# Patient Record
Sex: FEMALE | Race: OTHER | HISPANIC OR LATINO | URBAN - METROPOLITAN AREA
[De-identification: names, ages, dates, MRNs, and addresses within clinical notes are randomized per-mention and may not be internally consistent; named-entity substitution may affect disease eponyms.]

---

## 2023-06-27 VITALS
RESPIRATION RATE: 16 BRPM | DIASTOLIC BLOOD PRESSURE: 65 MMHG | WEIGHT: 139.99 LBS | OXYGEN SATURATION: 100 % | SYSTOLIC BLOOD PRESSURE: 145 MMHG | HEART RATE: 57 BPM | TEMPERATURE: 97 F

## 2023-06-27 RX ORDER — CHLORHEXIDINE GLUCONATE 213 G/1000ML
1 SOLUTION TOPICAL ONCE
Refills: 0 | Status: DISCONTINUED | OUTPATIENT
Start: 2023-07-05 | End: 2023-07-19

## 2023-06-27 NOTE — H&P ADULT - HISTORY OF PRESENT ILLNESS
Cardiologist: Dr. Marquez   Pharmacy:   Escort:     72F current smoker PMHx HTN, HLD, Afib (on Xarelto last dose _____) , VT, breast cancer s/p mastectomy s/p chemo/RT referred to Dr. Batres by outpatient cardiologist, Dr. Marquez, after positive stress test. Patient endorses chest tightness. . She denies CP, SOB, palpitations, orthopnea, syncope, presyncope, LE edema, decrease ET tolerance. Baseline sleeps w/ 1 pillow. NST12/12/2022 partially reversible changes in inferior wall suggesting ischemia. TTE per MD note EF 55% w/ septal LVH, basal inferoseptal hypokinesis, and G I DD. Holter per MD note episode of AFib w/ RVR that self terminate and episode of NSVT.     In light of pts risk factors, CCS class __ anginal symptoms and abnormal NST, pt now presents to [facility] for recommended cardiac catheterization with possible intervention if clinically indicated.  Cardiologist: Dr. Marquez   Pharmacy:   Escort:     72F current smoker PMHx HTN, HLD, Afib (on Xarelto last dose _____) , VT, breast cancer s/p mastectomy s/p chemo/RT referred to Dr. Batres by outpatient cardiologist, Dr. Marquez, after positive stress test. Patient endorses chest tightness. . She denies CP, SOB, palpitations, syncope, presyncope, LE edema, decrease ET tolerance. Baseline sleeps w/ 1 pillow. NST12/12/2022 partially reversible changes in inferior wall suggesting ischemia. TTE per MD note EF 55% w/ septal LVH, basal inferoseptal hypokinesis, and G I DD. Holter per MD note episode of AFib w/ RVR that self terminate and episode of NSVT.     In light of pts risk factors, CCS class __ anginal symptoms and abnormal NST, pt now presents to [facility] for recommended cardiac catheterization with possible intervention if clinically indicated.  Cardiologist: Dr. Marquez   Pharmacy:   Escort:     On Xarelto, called and informed patient last dose is 6/30    72F current smoker PMHx HTN, HLD, Afib (on Xarelto last dose _____) , VT, breast cancer s/p mastectomy s/p chemo/RT referred to Dr. Batres by outpatient cardiologist, Dr. Marquez, after positive stress test. Patient endorses chest tightness. . She denies CP, SOB, palpitations, syncope, presyncope, LE edema, decrease ET tolerance. Baseline sleeps w/ 1 pillow. NST12/12/2022 partially reversible changes in inferior wall suggesting ischemia. TTE per MD note EF 55% w/ septal LVH, basal inferoseptal hypokinesis, and G I DD. Holter per MD note episode of AFib w/ RVR that self terminate and episode of NSVT.     In light of pts risk factors, CCS class __ anginal symptoms and abnormal NST, pt now presents to [facility] for recommended cardiac catheterization with possible intervention if clinically indicated.  Cardiologist: Dr. Marquez   Pharmacy: [facility]  Escort: Daughter    72F current smoker PMHx HTN, HLD, Afib (on Xarelto last dose 7/2/23) , VT, breast cancer s/p mastectomy s/p chemo/RT +20 yrs ago referred to Dr. Batres by outpatient cardiologist, Dr. Marquez, after positive stress test. Patient endorses chest tightness in the past although chest pain free currently She denies, SOB, palpitations, syncope, presyncope, LE edema, decrease ET tolerance. Baseline sleeps w/ 1 pillow. NST12/12/2022 partially reversible changes in inferior wall suggesting ischemia. TTE per MD note EF 55% w/ septal LVH, basal inferoseptal hypokinesis, and G I DD. Holter per MD note episode of AFib w/ RVR that self terminate and episode of NSVT.     In light of pts risk factors, CCS class III anginal symptoms and abnormal NST, pt now presents to [facility] for recommended cardiac catheterization with possible intervention if clinically indicated.

## 2023-06-27 NOTE — H&P ADULT - NSICDXPASTMEDICALHX_GEN_ALL_CORE_FT
PAST MEDICAL HISTORY:  Atrial fibrillation     Breast cancer     Hyperlipidemia     Hypertension

## 2023-06-27 NOTE — H&P ADULT - NSHPLABSRESULTS_GEN_ALL_CORE
13.2   13.55 )-----------( 197      ( 05 Jul 2023 08:06 )             41.2       07-05    138  |  101  |  21  ----------------------------<  125<H>  4.4   |  25  |  1.36<H>    Ca    9.8      05 Jul 2023 08:02  Mg     1.8     07-05        PT/INR - ( 05 Jul 2023 08:06 )   PT: 12.4 sec;   INR: 1.04          PTT - ( 05 Jul 2023 08:06 )  PTT:26.4 sec    CARDIAC MARKERS ( 05 Jul 2023 08:02 )  x     / x     / 55 U/L / x     / 1.7 ng/mL        Urinalysis Basic - ( 05 Jul 2023 08:02 )    Color: x / Appearance: x / SG: x / pH: x  Gluc: 125 mg/dL / Ketone: x  / Bili: x / Urobili: x   Blood: x / Protein: x / Nitrite: x   Leuk Esterase: x / RBC: x / WBC x   Sq Epi: x / Non Sq Epi: x / Bacteria: x        EKG: NSR, sinus bradycardia HR 54, no acute ST-T findings

## 2023-06-27 NOTE — H&P ADULT - ASSESSMENT
72F current smoker PMHx HTN, HLD, Afib (on Xarelto last dose 7/2/23) , VT, breast cancer s/p mastectomy s/p chemo/RT +20 yrs ago referred to Dr. Batres by outpatient cardiologist, Dr. Marquez, after positive stress test. Baseline sleeps w/ 1 pillow. NST12/12/2022 partially reversible changes in inferior wall suggesting ischemia. TTE per MD note EF 55% w/ septal LVH, basal inferoseptal hypokinesis, and G I DD. Holter per MD note episode of AFib w/ RVR that self terminate and episode of NSVT. In light of pts risk factors, CCS class III anginal symptoms and abnormal NST, pt now presents to [facility] for recommended cardiac catheterization with possible intervention if clinically indicated.     -WBC 13.55 slight elevated. Pt without any flu-like sxs and afebrile otherwise.   -Pt H+H, platelets stable, Pt without any reports of BRBPR, hematuria, prior ICH, melena and no recent or previous GI bleed.   -Loaded with: [ ]81mg ASA, [ ]75mg Plavix,  [x ] ASA 325mg [x ] Plavix 600mg, [ ] No Load [ ]. due to not being on home.   -Pt Cr. 1.36 elevated (labs 6/17/23 outpatient with Cr 0.94)- and LVEF 50%, pre cath fluids ordered per protocol [x250ml IV bolus over 30 min, [ x] 75cc x2hrs, [ ] 50cc x2hrs, Patient euvolemic on exam. Discussed Cr elevation with MD, will hydrate with IV NS per protocol.   -Malampatti II  -ASA III    OF NOTE: Pt reports "allergy"/possible intolerance with aspirin as stomach burning/heart burn approx +30 years ago. Discussed with MD, okay to load w/ Aspirin 325mg po x 1.    Pt is a Candidate for Moderate Sedation, yes     was used for language Wolof ID [id number]    Risks & benefits of procedure and alternative therapy have been explained to the patient including but not limited to: allergic reaction, bleeding w/possible need for blood transfusion, infection, renal and vascular compromise, limb damage, arrhythmia, stroke, vessel dissection/perforation, Myocardial infarction, emergent CABG. Informed consent obtained and in chart

## 2023-07-05 ENCOUNTER — OUTPATIENT (OUTPATIENT)
Dept: OUTPATIENT SERVICES | Facility: HOSPITAL | Age: 73
LOS: 1 days | Discharge: HOME | End: 2023-07-05
Payer: MEDICAID

## 2023-07-05 DIAGNOSIS — Z90.13 ACQUIRED ABSENCE OF BILATERAL BREASTS AND NIPPLES: Chronic | ICD-10-CM

## 2023-07-05 LAB
A1C WITH ESTIMATED AVERAGE GLUCOSE RESULT: 6 % — HIGH (ref 4–5.6)
ANION GAP SERPL CALC-SCNC: 11 MMOL/L — SIGNIFICANT CHANGE UP (ref 5–17)
ANION GAP SERPL CALC-SCNC: 12 MMOL/L — SIGNIFICANT CHANGE UP (ref 5–17)
APTT BLD: 26.4 SEC — LOW (ref 27.5–35.5)
BASOPHILS # BLD AUTO: 0.06 K/UL — SIGNIFICANT CHANGE UP (ref 0–0.2)
BASOPHILS NFR BLD AUTO: 0.4 % — SIGNIFICANT CHANGE UP (ref 0–2)
BUN SERPL-MCNC: 18 MG/DL — SIGNIFICANT CHANGE UP (ref 7–23)
BUN SERPL-MCNC: 21 MG/DL — SIGNIFICANT CHANGE UP (ref 7–23)
CALCIUM SERPL-MCNC: 8.7 MG/DL — SIGNIFICANT CHANGE UP (ref 8.4–10.5)
CALCIUM SERPL-MCNC: 9.8 MG/DL — SIGNIFICANT CHANGE UP (ref 8.4–10.5)
CHLORIDE SERPL-SCNC: 101 MMOL/L — SIGNIFICANT CHANGE UP (ref 96–108)
CHLORIDE SERPL-SCNC: 103 MMOL/L — SIGNIFICANT CHANGE UP (ref 96–108)
CHOLEST SERPL-MCNC: 138 MG/DL — SIGNIFICANT CHANGE UP
CK MB CFR SERPL CALC: 1.7 NG/ML — SIGNIFICANT CHANGE UP (ref 0–6.7)
CK SERPL-CCNC: 55 U/L — SIGNIFICANT CHANGE UP (ref 25–170)
CO2 SERPL-SCNC: 24 MMOL/L — SIGNIFICANT CHANGE UP (ref 22–31)
CO2 SERPL-SCNC: 25 MMOL/L — SIGNIFICANT CHANGE UP (ref 22–31)
CREAT SERPL-MCNC: 1 MG/DL — SIGNIFICANT CHANGE UP (ref 0.5–1.3)
CREAT SERPL-MCNC: 1.36 MG/DL — HIGH (ref 0.5–1.3)
EGFR: 41 ML/MIN/1.73M2 — LOW
EGFR: 59 ML/MIN/1.73M2 — LOW
EOSINOPHIL # BLD AUTO: 0.39 K/UL — SIGNIFICANT CHANGE UP (ref 0–0.5)
EOSINOPHIL NFR BLD AUTO: 2.9 % — SIGNIFICANT CHANGE UP (ref 0–6)
ESTIMATED AVERAGE GLUCOSE: 126 MG/DL — HIGH (ref 68–114)
GLUCOSE SERPL-MCNC: 107 MG/DL — HIGH (ref 70–99)
GLUCOSE SERPL-MCNC: 125 MG/DL — HIGH (ref 70–99)
HCT VFR BLD CALC: 38.4 % — SIGNIFICANT CHANGE UP (ref 34.5–45)
HCT VFR BLD CALC: 41.2 % — SIGNIFICANT CHANGE UP (ref 34.5–45)
HCV AB S/CO SERPL IA: 0.06 S/CO — SIGNIFICANT CHANGE UP
HCV AB SERPL-IMP: SIGNIFICANT CHANGE UP
HDLC SERPL-MCNC: 46 MG/DL — LOW
HGB BLD-MCNC: 12.5 G/DL — SIGNIFICANT CHANGE UP (ref 11.5–15.5)
HGB BLD-MCNC: 13.2 G/DL — SIGNIFICANT CHANGE UP (ref 11.5–15.5)
IMM GRANULOCYTES NFR BLD AUTO: 0.6 % — SIGNIFICANT CHANGE UP (ref 0–0.9)
INR BLD: 1.04 — SIGNIFICANT CHANGE UP (ref 0.88–1.16)
ISTAT ACTK (ACTIVATED CLOTTING TIME KAOLIN): 209 SEC — HIGH (ref 74–137)
ISTAT ACTK (ACTIVATED CLOTTING TIME KAOLIN): 263 SEC — HIGH (ref 74–137)
LIPID PNL WITH DIRECT LDL SERPL: 62 MG/DL — SIGNIFICANT CHANGE UP
LYMPHOCYTES # BLD AUTO: 17.9 % — SIGNIFICANT CHANGE UP (ref 13–44)
LYMPHOCYTES # BLD AUTO: 2.43 K/UL — SIGNIFICANT CHANGE UP (ref 1–3.3)
MAGNESIUM SERPL-MCNC: 1.8 MG/DL — SIGNIFICANT CHANGE UP (ref 1.6–2.6)
MAGNESIUM SERPL-MCNC: 1.8 MG/DL — SIGNIFICANT CHANGE UP (ref 1.6–2.6)
MCHC RBC-ENTMCNC: 28.5 PG — SIGNIFICANT CHANGE UP (ref 27–34)
MCHC RBC-ENTMCNC: 28.6 PG — SIGNIFICANT CHANGE UP (ref 27–34)
MCHC RBC-ENTMCNC: 32 GM/DL — SIGNIFICANT CHANGE UP (ref 32–36)
MCHC RBC-ENTMCNC: 32.6 GM/DL — SIGNIFICANT CHANGE UP (ref 32–36)
MCV RBC AUTO: 87.5 FL — SIGNIFICANT CHANGE UP (ref 80–100)
MCV RBC AUTO: 89.2 FL — SIGNIFICANT CHANGE UP (ref 80–100)
MONOCYTES # BLD AUTO: 0.82 K/UL — SIGNIFICANT CHANGE UP (ref 0–0.9)
MONOCYTES NFR BLD AUTO: 6.1 % — SIGNIFICANT CHANGE UP (ref 2–14)
NEUTROPHILS # BLD AUTO: 9.77 K/UL — HIGH (ref 1.8–7.4)
NEUTROPHILS NFR BLD AUTO: 72.1 % — SIGNIFICANT CHANGE UP (ref 43–77)
NON HDL CHOLESTEROL: 92 MG/DL — SIGNIFICANT CHANGE UP
NRBC # BLD: 0 /100 WBCS — SIGNIFICANT CHANGE UP (ref 0–0)
NRBC # BLD: 0 /100 WBCS — SIGNIFICANT CHANGE UP (ref 0–0)
PLATELET # BLD AUTO: 171 K/UL — SIGNIFICANT CHANGE UP (ref 150–400)
PLATELET # BLD AUTO: 197 K/UL — SIGNIFICANT CHANGE UP (ref 150–400)
POTASSIUM SERPL-MCNC: 4.2 MMOL/L — SIGNIFICANT CHANGE UP (ref 3.5–5.3)
POTASSIUM SERPL-MCNC: 4.4 MMOL/L — SIGNIFICANT CHANGE UP (ref 3.5–5.3)
POTASSIUM SERPL-SCNC: 4.2 MMOL/L — SIGNIFICANT CHANGE UP (ref 3.5–5.3)
POTASSIUM SERPL-SCNC: 4.4 MMOL/L — SIGNIFICANT CHANGE UP (ref 3.5–5.3)
PROTHROM AB SERPL-ACNC: 12.4 SEC — SIGNIFICANT CHANGE UP (ref 10.5–13.4)
RBC # BLD: 4.39 M/UL — SIGNIFICANT CHANGE UP (ref 3.8–5.2)
RBC # BLD: 4.62 M/UL — SIGNIFICANT CHANGE UP (ref 3.8–5.2)
RBC # FLD: 14.2 % — SIGNIFICANT CHANGE UP (ref 10.3–14.5)
RBC # FLD: 14.5 % — SIGNIFICANT CHANGE UP (ref 10.3–14.5)
SODIUM SERPL-SCNC: 138 MMOL/L — SIGNIFICANT CHANGE UP (ref 135–145)
SODIUM SERPL-SCNC: 138 MMOL/L — SIGNIFICANT CHANGE UP (ref 135–145)
TRIGL SERPL-MCNC: 148 MG/DL — SIGNIFICANT CHANGE UP
WBC # BLD: 13.55 K/UL — HIGH (ref 3.8–10.5)
WBC # BLD: 9.72 K/UL — SIGNIFICANT CHANGE UP (ref 3.8–10.5)
WBC # FLD AUTO: 13.55 K/UL — HIGH (ref 3.8–10.5)
WBC # FLD AUTO: 9.72 K/UL — SIGNIFICANT CHANGE UP (ref 3.8–10.5)

## 2023-07-05 PROCEDURE — 92978 ENDOLUMINL IVUS OCT C 1ST: CPT | Mod: 26,RC

## 2023-07-05 PROCEDURE — 83036 HEMOGLOBIN GLYCOSYLATED A1C: CPT

## 2023-07-05 PROCEDURE — C1753: CPT

## 2023-07-05 PROCEDURE — 99152 MOD SED SAME PHYS/QHP 5/>YRS: CPT

## 2023-07-05 PROCEDURE — 92978 ENDOLUMINL IVUS OCT C 1ST: CPT | Mod: RC

## 2023-07-05 PROCEDURE — 85610 PROTHROMBIN TIME: CPT

## 2023-07-05 PROCEDURE — 80061 LIPID PANEL: CPT

## 2023-07-05 PROCEDURE — 83735 ASSAY OF MAGNESIUM: CPT

## 2023-07-05 PROCEDURE — 92928 PRQ TCAT PLMT NTRAC ST 1 LES: CPT | Mod: RC

## 2023-07-05 PROCEDURE — 93454 CORONARY ARTERY ANGIO S&I: CPT | Mod: 59

## 2023-07-05 PROCEDURE — 86803 HEPATITIS C AB TEST: CPT

## 2023-07-05 PROCEDURE — 82550 ASSAY OF CK (CPK): CPT

## 2023-07-05 PROCEDURE — 93005 ELECTROCARDIOGRAM TRACING: CPT

## 2023-07-05 PROCEDURE — C1769: CPT

## 2023-07-05 PROCEDURE — 82553 CREATINE MB FRACTION: CPT

## 2023-07-05 PROCEDURE — 93454 CORONARY ARTERY ANGIO S&I: CPT | Mod: 26,59

## 2023-07-05 PROCEDURE — 36415 COLL VENOUS BLD VENIPUNCTURE: CPT

## 2023-07-05 PROCEDURE — C1874: CPT

## 2023-07-05 PROCEDURE — 85027 COMPLETE CBC AUTOMATED: CPT

## 2023-07-05 PROCEDURE — C1894: CPT

## 2023-07-05 PROCEDURE — C9600: CPT | Mod: RC

## 2023-07-05 PROCEDURE — 85347 COAGULATION TIME ACTIVATED: CPT

## 2023-07-05 PROCEDURE — 93010 ELECTROCARDIOGRAM REPORT: CPT

## 2023-07-05 PROCEDURE — 85025 COMPLETE CBC W/AUTO DIFF WBC: CPT

## 2023-07-05 PROCEDURE — C1887: CPT

## 2023-07-05 PROCEDURE — 99153 MOD SED SAME PHYS/QHP EA: CPT

## 2023-07-05 PROCEDURE — 85730 THROMBOPLASTIN TIME PARTIAL: CPT

## 2023-07-05 PROCEDURE — C1725: CPT

## 2023-07-05 PROCEDURE — 80048 BASIC METABOLIC PNL TOTAL CA: CPT

## 2023-07-05 RX ORDER — SODIUM CHLORIDE 9 MG/ML
1000 INJECTION INTRAMUSCULAR; INTRAVENOUS; SUBCUTANEOUS
Refills: 0 | Status: DISCONTINUED | OUTPATIENT
Start: 2023-07-05 | End: 2023-07-19

## 2023-07-05 RX ORDER — SODIUM CHLORIDE 9 MG/ML
500 INJECTION INTRAMUSCULAR; INTRAVENOUS; SUBCUTANEOUS
Refills: 0 | Status: DISCONTINUED | OUTPATIENT
Start: 2023-07-05 | End: 2023-07-19

## 2023-07-05 RX ORDER — CLOPIDOGREL BISULFATE 75 MG/1
1 TABLET, FILM COATED ORAL
Qty: 30 | Refills: 11
Start: 2023-07-05 | End: 2024-06-28

## 2023-07-05 RX ORDER — ASPIRIN/CALCIUM CARB/MAGNESIUM 324 MG
325 TABLET ORAL ONCE
Refills: 0 | Status: COMPLETED | OUTPATIENT
Start: 2023-07-05 | End: 2023-07-05

## 2023-07-05 RX ORDER — RIVAROXABAN 15 MG-20MG
1 KIT ORAL
Qty: 30 | Refills: 11
Start: 2023-07-05 | End: 2024-06-28

## 2023-07-05 RX ORDER — ATORVASTATIN CALCIUM 80 MG/1
1 TABLET, FILM COATED ORAL
Qty: 30 | Refills: 11
Start: 2023-07-05 | End: 2024-06-28

## 2023-07-05 RX ORDER — SODIUM CHLORIDE 9 MG/ML
250 INJECTION INTRAMUSCULAR; INTRAVENOUS; SUBCUTANEOUS ONCE
Refills: 0 | Status: COMPLETED | OUTPATIENT
Start: 2023-07-05 | End: 2023-07-05

## 2023-07-05 RX ORDER — CLOPIDOGREL BISULFATE 75 MG/1
600 TABLET, FILM COATED ORAL ONCE
Refills: 0 | Status: COMPLETED | OUTPATIENT
Start: 2023-07-05 | End: 2023-07-05

## 2023-07-05 RX ORDER — MAGNESIUM OXIDE 400 MG ORAL TABLET 241.3 MG
800 TABLET ORAL ONCE
Refills: 0 | Status: COMPLETED | OUTPATIENT
Start: 2023-07-05 | End: 2023-07-05

## 2023-07-05 RX ORDER — AMLODIPINE BESYLATE 2.5 MG/1
1 TABLET ORAL
Refills: 0 | DISCHARGE

## 2023-07-05 RX ORDER — ATORVASTATIN CALCIUM 80 MG/1
1 TABLET, FILM COATED ORAL
Refills: 0 | DISCHARGE

## 2023-07-05 RX ADMIN — Medication 325 MILLIGRAM(S): at 10:00

## 2023-07-05 RX ADMIN — MAGNESIUM OXIDE 400 MG ORAL TABLET 800 MILLIGRAM(S): 241.3 TABLET ORAL at 14:48

## 2023-07-05 RX ADMIN — CLOPIDOGREL BISULFATE 600 MILLIGRAM(S): 75 TABLET, FILM COATED ORAL at 10:00

## 2023-07-05 RX ADMIN — SODIUM CHLORIDE 120 MILLILITER(S): 9 INJECTION INTRAMUSCULAR; INTRAVENOUS; SUBCUTANEOUS at 14:49

## 2023-07-05 RX ADMIN — SODIUM CHLORIDE 500 MILLILITER(S): 9 INJECTION INTRAMUSCULAR; INTRAVENOUS; SUBCUTANEOUS at 10:01

## 2023-07-05 RX ADMIN — SODIUM CHLORIDE 75 MILLILITER(S): 9 INJECTION INTRAMUSCULAR; INTRAVENOUS; SUBCUTANEOUS at 10:01

## 2023-07-05 NOTE — PROGRESS NOTE ADULT - SUBJECTIVE AND OBJECTIVE BOX
Interventional Cardiology PA Post PCI SDA Discharge Note      Patient without complaints. Ambulated and voided without difficulties    Afebrile, VSS    Ext:   Right Radial :   NO hematoma,   NO  bleeding, dressing; C/D/I      Pulses:    intact to baseline    A/P:  s/p [facility] with Dr. Batres/Yo IC fellow 7/5/2023 with JANIE to ostial RCA will come back for staged PCI of residual LCX-OM in 5 weeks    1.	Follow-up with PMD/Cardiologist Jimmy in 72 hours.  2.                 Post procedure labs/EKG reviewed and stable.    3.                 Pt given instructions on importance of taking antiplatelet medication.    4. 	Stable for discharge as per attending Dr. Batres after bed rest, pt voids, groin/wrist stable and 30 minutes of ambulation.  5.                 Prescriptions for DAPT Xarelto Plavix no ASA meds e-prescribed and submitted to patient's pharmacy.    6.                 Patient will continue statin   7.	Discharge forms signed and copies in chart     Interventional Cardiology PA Post PCI SDA Discharge Note      Patient without complaints. Ambulated and voided without difficulties    Afebrile, VSS    Ext:   Right Radial :   NO hematoma,   NO  bleeding, dressing; C/D/I      Pulses:    intact to baseline    A/P:  s/p [facility] with Dr. Batres/Yo IC fellow 7/5/2023 with JANIE to ostial RCA severe calcified ostial disease. LM: patent, LAD Luminal irregularities, LCX 60%, OM1/2 bifurcation disease severe 80%. Patient to come back for staged PCI of residual LCX-OM in 5        1.	Follow-up with PMD/Cardiologist Jimmy in 72 hours.  2.                 Post procedure labs/EKG reviewed and stable.    3.                 Pt given instructions on importance of taking antiplatelet medication.    4. 	Stable for discharge as per attending Dr. Batres after bed rest, pt voids, groin/wrist stable and 30 minutes of ambulation.  5.                 Prescriptions for DAPT Xarelto, Plavix no ASA meds e-prescribed and submitted to patient's pharmacy.    6.                 Patient will continue statin   7.	Discharge forms signed and copies in chart

## 2023-07-11 DIAGNOSIS — I25.110 ATHEROSCLEROTIC HEART DISEASE OF NATIVE CORONARY ARTERY WITH UNSTABLE ANGINA PECTORIS: ICD-10-CM

## 2023-07-11 DIAGNOSIS — I25.84 CORONARY ATHEROSCLEROSIS DUE TO CALCIFIED CORONARY LESION: ICD-10-CM

## 2023-07-11 DIAGNOSIS — R94.39 ABNORMAL RESULT OF OTHER CARDIOVASCULAR FUNCTION STUDY: ICD-10-CM

## 2023-08-29 PROBLEM — I48.91 UNSPECIFIED ATRIAL FIBRILLATION: Chronic | Status: ACTIVE | Noted: 2023-06-27

## 2023-08-29 PROBLEM — C50.919 MALIGNANT NEOPLASM OF UNSPECIFIED SITE OF UNSPECIFIED FEMALE BREAST: Chronic | Status: ACTIVE | Noted: 2023-06-27

## 2023-08-29 PROBLEM — E78.5 HYPERLIPIDEMIA, UNSPECIFIED: Chronic | Status: ACTIVE | Noted: 2023-06-27

## 2023-08-29 PROBLEM — I10 ESSENTIAL (PRIMARY) HYPERTENSION: Chronic | Status: ACTIVE | Noted: 2023-06-27

## 2023-09-05 VITALS
HEIGHT: 64 IN | HEART RATE: 55 BPM | SYSTOLIC BLOOD PRESSURE: 164 MMHG | TEMPERATURE: 97 F | RESPIRATION RATE: 18 BRPM | OXYGEN SATURATION: 98 % | DIASTOLIC BLOOD PRESSURE: 72 MMHG | WEIGHT: 141.98 LBS

## 2023-09-05 RX ORDER — CHLORHEXIDINE GLUCONATE 213 G/1000ML
1 SOLUTION TOPICAL ONCE
Refills: 0 | Status: DISCONTINUED | OUTPATIENT
Start: 2023-09-13 | End: 2023-09-27

## 2023-09-05 NOTE — H&P ADULT - NSHPLABSRESULTS_GEN_ALL_CORE
12.0   10.13 )-----------( 215      ( 13 Sep 2023 07:04 )             38.3               PT/INR - ( 13 Sep 2023 07:04 )   PT: 10.7 sec;   INR: 0.94          PTT - ( 13 Sep 2023 07:04 )  PTT:28.1 sec 12.0   10.13 )-----------( 215      ( 13 Sep 2023 07:04 )             38.3       09-13    141  |  104  |  13  ----------------------------<  126<H>  4.4   |  26  |  1.08    Ca    9.8      13 Sep 2023 07:04  Mg     1.8     09-13    TPro  7.5  /  Alb  4.2  /  TBili  0.4  /  DBili  x   /  AST  19  /  ALT  14  /  AlkPhos  123<H>  09-13      PT/INR - ( 13 Sep 2023 07:04 )   PT: 10.7 sec;   INR: 0.94          PTT - ( 13 Sep 2023 07:04 )  PTT:28.1 sec    CARDIAC MARKERS ( 13 Sep 2023 07:04 )  x     / x     / 64 U/L / x     / 2.2 ng/mL        Urinalysis Basic - ( 13 Sep 2023 07:04 )    Color: x / Appearance: x / SG: x / pH: x  Gluc: 126 mg/dL / Ketone: x  / Bili: x / Urobili: x   Blood: x / Protein: x / Nitrite: x   Leuk Esterase: x / RBC: x / WBC x   Sq Epi: x / Non Sq Epi: x / Bacteria: x

## 2023-09-05 NOTE — H&P ADULT - ASSESSMENT
72F, English speaking, active smoker, w/ PMHx of HTN, HLD, CAD s/p PCI (JANIE oRCA w/ residual OM1/2, 7/2023 @ [facility]), Afib (on Xarelto last dose ___) , h/o VT, and Breast CA (s/p mastectomy s/p chemo/RT +20 yrs ago), now returns to [facility] for recommended staged PCI of residual CAD.    -ASA 3, Mallampati 3  -Pt compliant w/ home ASA 81mg and Plavix 75mg, last dose ____  -Pre cath IVF Hydration: NS 250cc bolus then c/w maintenance NS @ 75cc/hr x 2hrs  -Pre cath consented    Risks & benefits of procedure and alternative therapy have been explained to the patient including but not limited to: allergic reaction, bleeding w/possible need for blood transfusion, infection, renal and vascular compromise, limb damage, arrhythmia, stroke, vessel dissection/perforation, Myocardial infarction, emergent CABG. Informed consent obtained and in chart.  72F, Macedonian speaking, active smoker, w/ PMHx of HTN, HLD, CAD s/p PCI (JANIE oRCA w/ residual OM1/2, 7/2023 @ [facility]), Afib (on Xarelto last dose 9/11/23) , h/o VT, and Breast CA (s/p mastectomy s/p chemo/RT +20 yrs ago), now returns to [facility] for recommended staged PCI of residual CAD.    -ASA 3, Mallampati 3  -Pt compliant w/ home Plavix 75mg, last dose 9/12/23. Will give home dose Plavix 75mg and load w/ ASA 325mg prior to cath. Of note, pt has h/o stomach upset w/ ASA many years ago, she tolerated ASA load in 7/2023 prior to PCI w/o any issues.  -Pre cath IVF Hydration: NS 250cc bolus then c/w maintenance NS @ 75cc/hr x 2hrs  -Pre cath consented    Risks & benefits of procedure and alternative therapy have been explained to the patient including but not limited to: allergic reaction, bleeding w/possible need for blood transfusion, infection, renal and vascular compromise, limb damage, arrhythmia, stroke, vessel dissection/perforation, Myocardial infarction, emergent CABG. Informed consent obtained and in chart.  72F, Azeri speaking, former smoker, w/ PMHx of HTN, HLD, CAD s/p PCI (JANIE oRCA w/ residual OM1/2, 7/2023 @ [facility]), Afib (on Xarelto last dose 9/11/23) , h/o VT, and Breast CA (s/p mastectomy s/p chemo/RT +20 yrs ago), now returns to [facility] for recommended staged PCI of residual CAD.    -ASA 3, Mallampati 3  -Pt compliant w/ home Plavix 75mg, last dose 9/12/23. Will give home dose Plavix 75mg and load w/ ASA 325mg prior to cath. Of note, pt has h/o stomach upset w/ ASA many years ago, she tolerated ASA load in 7/2023 prior to PCI w/o any issues.  -Pre cath IVF Hydration: NS 250cc bolus then c/w maintenance NS @ 75cc/hr x 2hrs  -Pre cath consented    Risks & benefits of procedure and alternative therapy have been explained to the patient including but not limited to: allergic reaction, bleeding w/possible need for blood transfusion, infection, renal and vascular compromise, limb damage, arrhythmia, stroke, vessel dissection/perforation, Myocardial infarction, emergent CABG. Informed consent obtained and in chart.  72F, Czech speaking, former smoker, w/ PMHx of HTN, HLD, CAD s/p PCI (JANIE oRCA w/ residual OM1/2, 7/2023 @ [facility]), Afib (on Xarelto last dose 9/11/23) , h/o VT, and Breast CA (s/p mastectomy s/p chemo/RT +20 yrs ago), now returns to [facility] for recommended staged PCI of residual CAD.    -ASA 3, Mallampati 3  -Pt compliant w/ home Plavix 75mg, last dose 9/12/23. Will give home dose Plavix 75mg and load w/ ASA 325mg prior to cath. Of note, pt has h/o stomach upset w/ ASA many years ago, she tolerated ASA load in 7/2023 prior to PCI w/o any issues.  -Pre cath IVF Hydration: NS 250cc bolus then c/w maintenance NS @ 75cc/hr x 2hrs  -Pre cath consented w/ language [employer] #[id number]    Risks & benefits of procedure and alternative therapy have been explained to the patient including but not limited to: allergic reaction, bleeding w/possible need for blood transfusion, infection, renal and vascular compromise, limb damage, arrhythmia, stroke, vessel dissection/perforation, Myocardial infarction, emergent CABG. Informed consent obtained and in chart.

## 2023-09-05 NOTE — H&P ADULT - HISTORY OF PRESENT ILLNESS
INCOMPLETE  Cardiologist: Dr. Marquez   Pharmacy: [facility]  Escort:     72F current smoker  F PMHx HTN, HLD, CAD (s/p PCI of pRCA 7/2023), Afib (on Xarelto last dose ___) , VT, breast cancer s/p mastectomy s/p chemo/RT +20 yrs ago referred to Dr. Batres by outpatient cardiologist, Dr. Marquez, complaining of chest tightness and bilateral shoulder intermittently. Pt states her exercise tolerance is about 1-2 flights of stairs.  Pt symptoms have improved since the prior cath.  Patient endorses chest tightness in the past although chest pain free currently She ___ denies, SOB, palpitations, syncope, presyncope, LE edema, decrease ET tolerance. Baseline sleeps w/ 1 pillow.     [facility] 7/05/23: AJNIE to ostial RCA severe calcified ostial disease. LM: patent, LAD Luminal irregularities, LCX 60%, OM1/2 bifurcation disease severe 80%.    NST12/12/2022: partially reversible changes in inferior wall suggesting ischemia.   TTE per MD note EF 55% w/ septal LVH, basal inferoseptal hypokinesis, and GIDD.   Holter per MD note episode of AFib w/ RVR that self terminate and episode of NSVT.     In light of pts risk factors, CCS class ___ anginal symptoms and prior abnormal cath, pt now presents to [facility] for recommended cardiac catheterization with staged PCI to LCx-OM1.   Cardiologist: Dr. Marquez   Pharmacy: [facility]  Escort:     72F, Sinhala speaking, active smoker, w/ PMHx of HTN, HLD, CAD s/p PCI (JANIE oRCA w/ residual OM1/2, 7/2023 @ [facility]), Afib (on Xarelto last dose ___) , h/o VT, and Breast CA (s/p mastectomy s/p chemo/RT +20 yrs ago), returns to [facility] for staged PCI of residual CAD. Pt reports persistent ____ and endorses DAPT compliance. She denies any ___ CP, palpitations, dizziness, syncope, diaphoresis, fatigue, LE edema, KO, orthopnea, PND, N/V, fever, chills or recent sick contact.     Cardiac Cath 7/05/23 @ [facility]: JANIE oRCA; LM: patent, LAD Luminal irregularities, LCX 60%, OM1/2 bifurcation disease severe 80%. TTE per MD note EF 55% w/ septal LVH, basal inferoseptal hypokinesis, and GIDD.     In light of pts risk factors, persistent CCS class __ anginal symptoms and known residual CAD, pt now presents to [facility] for recommended staged PCI of OM1/2. Cardiologist: Dr. aMrquez   Pharmacy: [facility]  Escort:     72F, Lithuanian speaking, active smoker, w/ PMHx of HTN, HLD, CAD s/p PCI (JANIE oRCA w/ residual OM1/2, 7/2023 @ [facility]), Afib (on Xarelto last dose 9/11/23) , h/o VT, and Breast CA (s/p mastectomy s/p chemo/RT +20 yrs ago), returns to [facility] for staged PCI of residual CAD. Pt reports persistent SOB and fatigue and endorses DAPT compliance. She denies any CP, palpitations, dizziness, syncope, diaphoresis, LE edema, orthopnea, PND, N/V, fever, chills or recent sick contact.     Cardiac Cath 7/05/23 @ [facility]: JANIE oRCA; LM: patent, LAD Luminal irregularities, LCX 60%, OM1/2 bifurcation disease severe 80%. TTE per MD note EF 55% w/ septal LVH, basal inferoseptal hypokinesis, and GIDD.     In light of pts risk factors, persistent CCS class III anginal symptoms and known residual CAD, pt now presents to [facility] for recommended staged PCI of OM1/2. Cardiologist: Dr. Marquez   Pharmacy: [facility]  Escort: Daughter    72F, Albanian speaking, former smoker, w/ PMHx of HTN, HLD, CAD s/p PCI (JANIE oRCA w/ residual OM1/2, 7/2023 @ [facility]), Afib (on Xarelto last dose 9/11/23) , h/o VT, and Breast CA (s/p mastectomy s/p chemo/RT +20 yrs ago), returns to [facility] for staged PCI of residual CAD. Pt reports persistent SOB and fatigue and endorses DAPT compliance. She denies any CP, palpitations, dizziness, syncope, diaphoresis, LE edema, orthopnea, PND, N/V, fever, chills or recent sick contact.     Cardiac Cath 7/05/23 @ [facility]: JANIE oRCA; LM: patent, LAD Luminal irregularities, LCX 60%, OM1/2 bifurcation disease severe 80%. TTE per MD note EF 55% w/ septal LVH, basal inferoseptal hypokinesis, and GIDD.    In light of pts risk factors, persistent CCS class III anginal symptoms and known residual CAD, pt now presents to [facility] for recommended staged PCI of OM1/2.

## 2023-09-13 ENCOUNTER — OUTPATIENT (OUTPATIENT)
Dept: OUTPATIENT SERVICES | Facility: HOSPITAL | Age: 73
LOS: 1 days | Discharge: HOME | End: 2023-09-13
Payer: MEDICAID

## 2023-09-13 DIAGNOSIS — Z90.13 ACQUIRED ABSENCE OF BILATERAL BREASTS AND NIPPLES: Chronic | ICD-10-CM

## 2023-09-13 LAB
A1C WITH ESTIMATED AVERAGE GLUCOSE RESULT: 5.9 % — HIGH (ref 4–5.6)
ALBUMIN SERPL ELPH-MCNC: 4.2 G/DL — SIGNIFICANT CHANGE UP (ref 3.3–5)
ALP SERPL-CCNC: 123 U/L — HIGH (ref 40–120)
ALT FLD-CCNC: 14 U/L — SIGNIFICANT CHANGE UP (ref 10–45)
ANION GAP SERPL CALC-SCNC: 11 MMOL/L — SIGNIFICANT CHANGE UP (ref 5–17)
ANION GAP SERPL CALC-SCNC: 9 MMOL/L — SIGNIFICANT CHANGE UP (ref 5–17)
APTT BLD: 28.1 SEC — SIGNIFICANT CHANGE UP (ref 24.5–35.6)
AST SERPL-CCNC: 19 U/L — SIGNIFICANT CHANGE UP (ref 10–40)
BASOPHILS # BLD AUTO: 0.05 K/UL — SIGNIFICANT CHANGE UP (ref 0–0.2)
BASOPHILS NFR BLD AUTO: 0.5 % — SIGNIFICANT CHANGE UP (ref 0–2)
BILIRUB SERPL-MCNC: 0.4 MG/DL — SIGNIFICANT CHANGE UP (ref 0.2–1.2)
BUN SERPL-MCNC: 13 MG/DL — SIGNIFICANT CHANGE UP (ref 7–23)
BUN SERPL-MCNC: 13 MG/DL — SIGNIFICANT CHANGE UP (ref 7–23)
CALCIUM SERPL-MCNC: 9.6 MG/DL — SIGNIFICANT CHANGE UP (ref 8.4–10.5)
CALCIUM SERPL-MCNC: 9.8 MG/DL — SIGNIFICANT CHANGE UP (ref 8.4–10.5)
CHLORIDE SERPL-SCNC: 104 MMOL/L — SIGNIFICANT CHANGE UP (ref 96–108)
CHLORIDE SERPL-SCNC: 104 MMOL/L — SIGNIFICANT CHANGE UP (ref 96–108)
CHOLEST SERPL-MCNC: 121 MG/DL — SIGNIFICANT CHANGE UP
CK MB CFR SERPL CALC: 2.2 NG/ML — SIGNIFICANT CHANGE UP (ref 0–6.7)
CK SERPL-CCNC: 64 U/L — SIGNIFICANT CHANGE UP (ref 25–170)
CO2 SERPL-SCNC: 26 MMOL/L — SIGNIFICANT CHANGE UP (ref 22–31)
CO2 SERPL-SCNC: 29 MMOL/L — SIGNIFICANT CHANGE UP (ref 22–31)
CREAT SERPL-MCNC: 0.99 MG/DL — SIGNIFICANT CHANGE UP (ref 0.5–1.3)
CREAT SERPL-MCNC: 1.08 MG/DL — SIGNIFICANT CHANGE UP (ref 0.5–1.3)
EGFR: 54 ML/MIN/1.73M2 — LOW
EGFR: 59 ML/MIN/1.73M2 — LOW
EOSINOPHIL # BLD AUTO: 0.26 K/UL — SIGNIFICANT CHANGE UP (ref 0–0.5)
EOSINOPHIL NFR BLD AUTO: 2.6 % — SIGNIFICANT CHANGE UP (ref 0–6)
ESTIMATED AVERAGE GLUCOSE: 123 MG/DL — HIGH (ref 68–114)
GLUCOSE SERPL-MCNC: 119 MG/DL — HIGH (ref 70–99)
GLUCOSE SERPL-MCNC: 126 MG/DL — HIGH (ref 70–99)
HCT VFR BLD CALC: 36.1 % — SIGNIFICANT CHANGE UP (ref 34.5–45)
HCT VFR BLD CALC: 38.3 % — SIGNIFICANT CHANGE UP (ref 34.5–45)
HDLC SERPL-MCNC: 47 MG/DL — LOW
HGB BLD-MCNC: 11.6 G/DL — SIGNIFICANT CHANGE UP (ref 11.5–15.5)
HGB BLD-MCNC: 12 G/DL — SIGNIFICANT CHANGE UP (ref 11.5–15.5)
IMM GRANULOCYTES NFR BLD AUTO: 0.6 % — SIGNIFICANT CHANGE UP (ref 0–0.9)
INR BLD: 0.94 — SIGNIFICANT CHANGE UP (ref 0.85–1.18)
LIPID PNL WITH DIRECT LDL SERPL: 50 MG/DL — SIGNIFICANT CHANGE UP
LYMPHOCYTES # BLD AUTO: 2.07 K/UL — SIGNIFICANT CHANGE UP (ref 1–3.3)
LYMPHOCYTES # BLD AUTO: 20.4 % — SIGNIFICANT CHANGE UP (ref 13–44)
MAGNESIUM SERPL-MCNC: 1.8 MG/DL — SIGNIFICANT CHANGE UP (ref 1.6–2.6)
MAGNESIUM SERPL-MCNC: 1.8 MG/DL — SIGNIFICANT CHANGE UP (ref 1.6–2.6)
MCHC RBC-ENTMCNC: 28 PG — SIGNIFICANT CHANGE UP (ref 27–34)
MCHC RBC-ENTMCNC: 29.1 PG — SIGNIFICANT CHANGE UP (ref 27–34)
MCHC RBC-ENTMCNC: 31.3 GM/DL — LOW (ref 32–36)
MCHC RBC-ENTMCNC: 32.1 GM/DL — SIGNIFICANT CHANGE UP (ref 32–36)
MCV RBC AUTO: 89.5 FL — SIGNIFICANT CHANGE UP (ref 80–100)
MCV RBC AUTO: 90.5 FL — SIGNIFICANT CHANGE UP (ref 80–100)
MONOCYTES # BLD AUTO: 0.7 K/UL — SIGNIFICANT CHANGE UP (ref 0–0.9)
MONOCYTES NFR BLD AUTO: 6.9 % — SIGNIFICANT CHANGE UP (ref 2–14)
NEUTROPHILS # BLD AUTO: 6.99 K/UL — SIGNIFICANT CHANGE UP (ref 1.8–7.4)
NEUTROPHILS NFR BLD AUTO: 69 % — SIGNIFICANT CHANGE UP (ref 43–77)
NON HDL CHOLESTEROL: 74 MG/DL — SIGNIFICANT CHANGE UP
NRBC # BLD: 0 /100 WBCS — SIGNIFICANT CHANGE UP (ref 0–0)
NRBC # BLD: 0 /100 WBCS — SIGNIFICANT CHANGE UP (ref 0–0)
PLATELET # BLD AUTO: 197 K/UL — SIGNIFICANT CHANGE UP (ref 150–400)
PLATELET # BLD AUTO: 215 K/UL — SIGNIFICANT CHANGE UP (ref 150–400)
POTASSIUM SERPL-MCNC: 4.4 MMOL/L — SIGNIFICANT CHANGE UP (ref 3.5–5.3)
POTASSIUM SERPL-MCNC: 4.6 MMOL/L — SIGNIFICANT CHANGE UP (ref 3.5–5.3)
POTASSIUM SERPL-SCNC: 4.4 MMOL/L — SIGNIFICANT CHANGE UP (ref 3.5–5.3)
POTASSIUM SERPL-SCNC: 4.6 MMOL/L — SIGNIFICANT CHANGE UP (ref 3.5–5.3)
PROT SERPL-MCNC: 7.5 G/DL — SIGNIFICANT CHANGE UP (ref 6–8.3)
PROTHROM AB SERPL-ACNC: 10.7 SEC — SIGNIFICANT CHANGE UP (ref 9.5–13)
RBC # BLD: 3.99 M/UL — SIGNIFICANT CHANGE UP (ref 3.8–5.2)
RBC # BLD: 4.28 M/UL — SIGNIFICANT CHANGE UP (ref 3.8–5.2)
RBC # FLD: 13.4 % — SIGNIFICANT CHANGE UP (ref 10.3–14.5)
RBC # FLD: 13.5 % — SIGNIFICANT CHANGE UP (ref 10.3–14.5)
SODIUM SERPL-SCNC: 141 MMOL/L — SIGNIFICANT CHANGE UP (ref 135–145)
SODIUM SERPL-SCNC: 142 MMOL/L — SIGNIFICANT CHANGE UP (ref 135–145)
TRIGL SERPL-MCNC: 120 MG/DL — SIGNIFICANT CHANGE UP
WBC # BLD: 10.13 K/UL — SIGNIFICANT CHANGE UP (ref 3.8–10.5)
WBC # BLD: 8.01 K/UL — SIGNIFICANT CHANGE UP (ref 3.8–10.5)
WBC # FLD AUTO: 10.13 K/UL — SIGNIFICANT CHANGE UP (ref 3.8–10.5)
WBC # FLD AUTO: 8.01 K/UL — SIGNIFICANT CHANGE UP (ref 3.8–10.5)

## 2023-09-13 PROCEDURE — C1874: CPT

## 2023-09-13 PROCEDURE — 36415 COLL VENOUS BLD VENIPUNCTURE: CPT

## 2023-09-13 PROCEDURE — 83735 ASSAY OF MAGNESIUM: CPT

## 2023-09-13 PROCEDURE — 80048 BASIC METABOLIC PNL TOTAL CA: CPT

## 2023-09-13 PROCEDURE — 80061 LIPID PANEL: CPT

## 2023-09-13 PROCEDURE — C1753: CPT

## 2023-09-13 PROCEDURE — 83036 HEMOGLOBIN GLYCOSYLATED A1C: CPT

## 2023-09-13 PROCEDURE — 93010 ELECTROCARDIOGRAM REPORT: CPT | Mod: 59

## 2023-09-13 PROCEDURE — C9600: CPT | Mod: LC

## 2023-09-13 PROCEDURE — 85610 PROTHROMBIN TIME: CPT

## 2023-09-13 PROCEDURE — 85730 THROMBOPLASTIN TIME PARTIAL: CPT

## 2023-09-13 PROCEDURE — 82553 CREATINE MB FRACTION: CPT

## 2023-09-13 PROCEDURE — 85347 COAGULATION TIME ACTIVATED: CPT

## 2023-09-13 PROCEDURE — C1725: CPT

## 2023-09-13 PROCEDURE — 85027 COMPLETE CBC AUTOMATED: CPT

## 2023-09-13 PROCEDURE — C1887: CPT

## 2023-09-13 PROCEDURE — 85025 COMPLETE CBC W/AUTO DIFF WBC: CPT

## 2023-09-13 PROCEDURE — 93005 ELECTROCARDIOGRAM TRACING: CPT

## 2023-09-13 PROCEDURE — C1769: CPT

## 2023-09-13 PROCEDURE — 92978 ENDOLUMINL IVUS OCT C 1ST: CPT | Mod: 26,LC

## 2023-09-13 PROCEDURE — 80053 COMPREHEN METABOLIC PANEL: CPT

## 2023-09-13 PROCEDURE — C1894: CPT

## 2023-09-13 PROCEDURE — 92978 ENDOLUMINL IVUS OCT C 1ST: CPT | Mod: LC

## 2023-09-13 PROCEDURE — 82550 ASSAY OF CK (CPK): CPT

## 2023-09-13 PROCEDURE — 92928 PRQ TCAT PLMT NTRAC ST 1 LES: CPT | Mod: LC

## 2023-09-13 PROCEDURE — 99152 MOD SED SAME PHYS/QHP 5/>YRS: CPT

## 2023-09-13 RX ORDER — ASPIRIN/CALCIUM CARB/MAGNESIUM 324 MG
325 TABLET ORAL ONCE
Refills: 0 | Status: COMPLETED | OUTPATIENT
Start: 2023-09-13 | End: 2023-09-13

## 2023-09-13 RX ORDER — ATORVASTATIN CALCIUM 80 MG/1
1 TABLET, FILM COATED ORAL
Refills: 0 | DISCHARGE

## 2023-09-13 RX ORDER — SODIUM CHLORIDE 9 MG/ML
500 INJECTION INTRAMUSCULAR; INTRAVENOUS; SUBCUTANEOUS
Refills: 0 | Status: DISCONTINUED | OUTPATIENT
Start: 2023-09-13 | End: 2023-09-13

## 2023-09-13 RX ORDER — RIVAROXABAN 15 MG-20MG
1 KIT ORAL
Refills: 0 | DISCHARGE

## 2023-09-13 RX ORDER — CLOPIDOGREL BISULFATE 75 MG/1
1 TABLET, FILM COATED ORAL
Qty: 30 | Refills: 11
Start: 2023-09-13 | End: 2024-09-06

## 2023-09-13 RX ORDER — METOPROLOL TARTRATE 50 MG
1.5 TABLET ORAL
Refills: 0 | DISCHARGE

## 2023-09-13 RX ORDER — SODIUM CHLORIDE 9 MG/ML
250 INJECTION INTRAMUSCULAR; INTRAVENOUS; SUBCUTANEOUS ONCE
Refills: 0 | Status: COMPLETED | OUTPATIENT
Start: 2023-09-13 | End: 2023-09-13

## 2023-09-13 RX ORDER — MAGNESIUM OXIDE 400 MG ORAL TABLET 241.3 MG
400 TABLET ORAL ONCE
Refills: 0 | Status: COMPLETED | OUTPATIENT
Start: 2023-09-13 | End: 2023-09-13

## 2023-09-13 RX ORDER — AMLODIPINE BESYLATE 2.5 MG/1
1 TABLET ORAL
Refills: 0 | DISCHARGE

## 2023-09-13 RX ORDER — CLOPIDOGREL BISULFATE 75 MG/1
75 TABLET, FILM COATED ORAL DAILY
Refills: 0 | Status: DISCONTINUED | OUTPATIENT
Start: 2023-09-13 | End: 2023-09-27

## 2023-09-13 RX ORDER — RIVAROXABAN 15 MG-20MG
1 KIT ORAL
Qty: 30 | Refills: 2
Start: 2023-09-13 | End: 2023-12-11

## 2023-09-13 RX ORDER — SODIUM CHLORIDE 9 MG/ML
500 INJECTION INTRAMUSCULAR; INTRAVENOUS; SUBCUTANEOUS
Refills: 0 | Status: DISCONTINUED | OUTPATIENT
Start: 2023-09-13 | End: 2023-09-27

## 2023-09-13 RX ORDER — LOSARTAN POTASSIUM 100 MG/1
1 TABLET, FILM COATED ORAL
Refills: 0 | DISCHARGE

## 2023-09-13 RX ORDER — ATORVASTATIN CALCIUM 80 MG/1
1 TABLET, FILM COATED ORAL
Qty: 30 | Refills: 2
Start: 2023-09-13 | End: 2023-12-11

## 2023-09-13 RX ADMIN — CLOPIDOGREL BISULFATE 75 MILLIGRAM(S): 75 TABLET, FILM COATED ORAL at 08:32

## 2023-09-13 RX ADMIN — SODIUM CHLORIDE 190 MILLILITER(S): 9 INJECTION INTRAMUSCULAR; INTRAVENOUS; SUBCUTANEOUS at 12:54

## 2023-09-13 RX ADMIN — MAGNESIUM OXIDE 400 MG ORAL TABLET 400 MILLIGRAM(S): 241.3 TABLET ORAL at 15:52

## 2023-09-13 RX ADMIN — SODIUM CHLORIDE 500 MILLILITER(S): 9 INJECTION INTRAMUSCULAR; INTRAVENOUS; SUBCUTANEOUS at 08:32

## 2023-09-13 RX ADMIN — Medication 325 MILLIGRAM(S): at 08:32

## 2023-09-13 RX ADMIN — SODIUM CHLORIDE 75 MILLILITER(S): 9 INJECTION INTRAMUSCULAR; INTRAVENOUS; SUBCUTANEOUS at 08:32

## 2023-09-13 NOTE — PROGRESS NOTE ADULT - SUBJECTIVE AND OBJECTIVE BOX
Interventional Cardiology PA Post PCI SDA Discharge Note    Patient without complaints. Ambulated and voided without difficulties    Ext: Right Radial: no hematoma, no bleeding, dressing; C/D/I          Pulses: intact RAD to baseline     A/P: 72F, Macedonian speaking, former smoker, w/ PMHx of HTN, HLD, CAD s/p PCI (JANIE oRCA w/ residual OM1/2, 7/2023 @ [facility]), Afib (on Xarelto last dose 9/11/23) , h/o VT, and Breast CA (s/p mastectomy s/p chemo/RT +20 yrs ago), now returns to [facility] for recommended staged PCI of residual CAD. Patient is now s/p cardiac catheterization (9/13/23) with Dr. Batres: LCx/OM1 (80% bifurcating stenosis) s/p IVUS-guided scoreflex/DESx1, LM MLI, LAD mild diffuse disease. RCA not injected given recent angiogram and low GFR. Access: right radial, removed and hemostasis achieved, no complications.     Communication with patient post-cath via [employer]  Manuel ID #[id number].    1. Follow-up with cardiologist Dr. Marquez in 72 hours.  2. Post procedure labs/EKG reviewed and stable.    3. Pt given instructions on importance of taking antiplatelet medication (Xarelto and Plavix as patient is ASA intolerant).   4. Stable for discharge as per attending Dr. Batres after bed rest, pt voids, wrist stable and 30 minutes of ambulation.  5. Prescriptions for Plavix e-prescribed and submitted to patient's pharmacy.  6. Patient will re-start Xarelto tomorrow 9/14/23.            Interventional Cardiology PA Post PCI SDA Discharge Note    Patient without complaints. Ambulated and voided without difficulties    Ext: Right Radial: no hematoma, no bleeding, dressing; C/D/I          Pulses: intact RAD to baseline     A/P: 72F, Maltese speaking, former smoker, w/ PMHx of HTN, HLD, CAD s/p PCI (JANIE oRCA w/ residual OM1/2, 7/2023 @ [facility]), Afib (on Xarelto last dose 9/11/23) , h/o VT, and Breast CA (s/p mastectomy s/p chemo/RT +20 yrs ago), now returns to [facility] for recommended staged PCI of residual CAD. Patient is now s/p cardiac catheterization (9/13/23) with Dr. Batres: LCx/OM1 (80% bifurcating stenosis) s/p IVUS-guided scoreflex/DESx1, LM MLI, LAD mild diffuse disease. RCA not injected given recent angiogram and low GFR. Access: right radial. Right radial TR removed and hemostasis achieved, no complications.     Communication with patient post-cath via [employer]  Manuel ID #[id number].    1. Follow-up with cardiologist Dr. Marquez in 72 hours.  2. Post procedure labs/EKG reviewed and stable.    3. Pt given instructions on importance of taking antiplatelet medication (Xarelto and Plavix as patient is ASA intolerant).   4. Stable for discharge as per attending Dr. Batres after bed rest, pt voids, wrist stable and 30 minutes of ambulation.  5. Prescriptions for Plavix e-prescribed and submitted to patient's pharmacy.  6. Patient will re-start Xarelto tomorrow 9/14/23 per interventional team.

## 2023-09-22 DIAGNOSIS — I25.10 ATHEROSCLEROTIC HEART DISEASE OF NATIVE CORONARY ARTERY WITHOUT ANGINA PECTORIS: ICD-10-CM

## 2023-09-22 DIAGNOSIS — I25.84 CORONARY ATHEROSCLEROSIS DUE TO CALCIFIED CORONARY LESION: ICD-10-CM

## 2023-09-22 DIAGNOSIS — R94.39 ABNORMAL RESULT OF OTHER CARDIOVASCULAR FUNCTION STUDY: ICD-10-CM

## 2023-10-02 LAB — ISTAT ACTK (ACTIVATED CLOTTING TIME KAOLIN): 263 SEC — HIGH (ref 74–137)

## 2025-08-29 ENCOUNTER — OUTPATIENT (OUTPATIENT)
Dept: OUTPATIENT SERVICES | Facility: HOSPITAL | Age: 75
LOS: 1 days | End: 2025-08-29

## 2025-08-29 DIAGNOSIS — I25.10 ATHEROSCLEROTIC HEART DISEASE OF NATIVE CORONARY ARTERY WITHOUT ANGINA PECTORIS: ICD-10-CM

## 2025-08-29 DIAGNOSIS — Z90.13 ACQUIRED ABSENCE OF BILATERAL BREASTS AND NIPPLES: Chronic | ICD-10-CM
